# Patient Record
Sex: FEMALE | Race: OTHER
[De-identification: names, ages, dates, MRNs, and addresses within clinical notes are randomized per-mention and may not be internally consistent; named-entity substitution may affect disease eponyms.]

---

## 2019-06-26 ENCOUNTER — HOSPITAL ENCOUNTER (OUTPATIENT)
Dept: HOSPITAL 15 - LAB | Age: 84
Discharge: HOME | End: 2019-06-26
Attending: INTERNAL MEDICINE
Payer: COMMERCIAL

## 2019-06-26 DIAGNOSIS — E11.9: Primary | ICD-10-CM

## 2019-06-26 PROCEDURE — 82043 UR ALBUMIN QUANTITATIVE: CPT

## 2019-06-26 PROCEDURE — 83036 HEMOGLOBIN GLYCOSYLATED A1C: CPT

## 2019-06-26 PROCEDURE — 36415 COLL VENOUS BLD VENIPUNCTURE: CPT

## 2020-05-28 ENCOUNTER — HOSPITAL ENCOUNTER (OUTPATIENT)
Dept: HOSPITAL 15 - LAB | Age: 85
Discharge: HOME | End: 2020-05-28
Attending: INTERNAL MEDICINE
Payer: COMMERCIAL

## 2020-05-28 DIAGNOSIS — E11.9: Primary | ICD-10-CM

## 2020-05-28 DIAGNOSIS — I10: ICD-10-CM

## 2020-05-28 LAB
CRYSTALS UR QL AUTO: (no result) /HPF
WBC CLUMPS UR QL AUTO: PRESENT /HPF

## 2020-05-28 PROCEDURE — 82043 UR ALBUMIN QUANTITATIVE: CPT

## 2020-05-28 PROCEDURE — 36415 COLL VENOUS BLD VENIPUNCTURE: CPT

## 2020-05-28 PROCEDURE — 83036 HEMOGLOBIN GLYCOSYLATED A1C: CPT

## 2020-05-28 PROCEDURE — 81001 URINALYSIS AUTO W/SCOPE: CPT

## 2020-10-23 ENCOUNTER — HOSPITAL ENCOUNTER (INPATIENT)
Dept: HOSPITAL 15 - ER | Age: 85
LOS: 3 days | Discharge: HOME | DRG: 103 | End: 2020-10-26
Attending: FAMILY MEDICINE | Admitting: NURSE PRACTITIONER
Payer: COMMERCIAL

## 2020-10-23 VITALS — BODY MASS INDEX: 28.2 KG/M2 | HEIGHT: 66 IN | WEIGHT: 175.49 LBS

## 2020-10-23 DIAGNOSIS — Z79.891: ICD-10-CM

## 2020-10-23 DIAGNOSIS — Z90.710: ICD-10-CM

## 2020-10-23 DIAGNOSIS — Z79.01: ICD-10-CM

## 2020-10-23 DIAGNOSIS — Z79.899: ICD-10-CM

## 2020-10-23 DIAGNOSIS — Z86.73: ICD-10-CM

## 2020-10-23 DIAGNOSIS — I48.91: ICD-10-CM

## 2020-10-23 DIAGNOSIS — G44.40: Primary | ICD-10-CM

## 2020-10-23 DIAGNOSIS — K21.9: ICD-10-CM

## 2020-10-23 DIAGNOSIS — G25.81: ICD-10-CM

## 2020-10-23 DIAGNOSIS — Z88.2: ICD-10-CM

## 2020-10-23 DIAGNOSIS — I48.0: ICD-10-CM

## 2020-10-23 DIAGNOSIS — I10: ICD-10-CM

## 2020-10-23 DIAGNOSIS — G43.109: ICD-10-CM

## 2020-10-23 DIAGNOSIS — E87.6: ICD-10-CM

## 2020-10-23 DIAGNOSIS — N17.9: ICD-10-CM

## 2020-10-23 DIAGNOSIS — F17.200: ICD-10-CM

## 2020-10-23 DIAGNOSIS — E11.8: ICD-10-CM

## 2020-10-23 DIAGNOSIS — F32.9: ICD-10-CM

## 2020-10-23 DIAGNOSIS — Z82.49: ICD-10-CM

## 2020-10-23 DIAGNOSIS — E78.5: ICD-10-CM

## 2020-10-23 LAB
ALBUMIN SERPL-MCNC: 3.5 G/DL (ref 3.4–5)
ALP SERPL-CCNC: 93 U/L (ref 45–117)
ALT SERPL-CCNC: 32 U/L (ref 13–56)
ANION GAP SERPL CALCULATED.3IONS-SCNC: 6 MMOL/L (ref 5–15)
BILIRUB SERPL-MCNC: 0.6 MG/DL (ref 0.2–1)
BUN SERPL-MCNC: 19 MG/DL (ref 7–18)
BUN/CREAT SERPL: 13.9
CALCIUM SERPL-MCNC: 8.8 MG/DL (ref 8.5–10.1)
CHLORIDE SERPL-SCNC: 98 MMOL/L (ref 98–107)
CO2 SERPL-SCNC: 31 MMOL/L (ref 21–32)
GLUCOSE SERPL-MCNC: 133 MG/DL (ref 74–106)
HCT VFR BLD AUTO: 51.2 % (ref 36–46)
HGB BLD-MCNC: 17.2 G/DL (ref 12.2–16.2)
MAGNESIUM SERPL-MCNC: 1.9 MG/DL (ref 1.6–2.6)
MCH RBC QN AUTO: 31.6 PG (ref 28–32)
MCV RBC AUTO: 93.8 FL (ref 80–100)
NRBC BLD QL AUTO: 0.2 %
POTASSIUM SERPL-SCNC: 3 MMOL/L (ref 3.5–5.1)
PROT SERPL-MCNC: 7 G/DL (ref 6.4–8.2)
SODIUM SERPL-SCNC: 135 MMOL/L (ref 136–145)

## 2020-10-23 PROCEDURE — 71045 X-RAY EXAM CHEST 1 VIEW: CPT

## 2020-10-23 PROCEDURE — 85025 COMPLETE CBC W/AUTO DIFF WBC: CPT

## 2020-10-23 PROCEDURE — 84484 ASSAY OF TROPONIN QUANT: CPT

## 2020-10-23 PROCEDURE — 83036 HEMOGLOBIN GLYCOSYLATED A1C: CPT

## 2020-10-23 PROCEDURE — 80053 COMPREHEN METABOLIC PANEL: CPT

## 2020-10-23 PROCEDURE — 93306 TTE W/DOPPLER COMPLETE: CPT

## 2020-10-23 PROCEDURE — 83735 ASSAY OF MAGNESIUM: CPT

## 2020-10-23 PROCEDURE — 70450 CT HEAD/BRAIN W/O DYE: CPT

## 2020-10-23 PROCEDURE — 93886 INTRACRANIAL COMPLETE STUDY: CPT

## 2020-10-23 PROCEDURE — 85652 RBC SED RATE AUTOMATED: CPT

## 2020-10-23 PROCEDURE — 36415 COLL VENOUS BLD VENIPUNCTURE: CPT

## 2020-10-24 VITALS — DIASTOLIC BLOOD PRESSURE: 97 MMHG | SYSTOLIC BLOOD PRESSURE: 131 MMHG

## 2020-10-24 VITALS — DIASTOLIC BLOOD PRESSURE: 71 MMHG | SYSTOLIC BLOOD PRESSURE: 108 MMHG

## 2020-10-24 VITALS — DIASTOLIC BLOOD PRESSURE: 67 MMHG | SYSTOLIC BLOOD PRESSURE: 129 MMHG

## 2020-10-24 VITALS — DIASTOLIC BLOOD PRESSURE: 68 MMHG | SYSTOLIC BLOOD PRESSURE: 123 MMHG

## 2020-10-24 VITALS — SYSTOLIC BLOOD PRESSURE: 129 MMHG | DIASTOLIC BLOOD PRESSURE: 67 MMHG

## 2020-10-24 VITALS — DIASTOLIC BLOOD PRESSURE: 63 MMHG | SYSTOLIC BLOOD PRESSURE: 112 MMHG

## 2020-10-24 VITALS — DIASTOLIC BLOOD PRESSURE: 64 MMHG | SYSTOLIC BLOOD PRESSURE: 106 MMHG

## 2020-10-24 LAB
ALBUMIN SERPL-MCNC: 2.8 G/DL (ref 3.4–5)
ALP SERPL-CCNC: 76 U/L (ref 45–117)
ALT SERPL-CCNC: 26 U/L (ref 13–56)
ANION GAP SERPL CALCULATED.3IONS-SCNC: 6 MMOL/L (ref 5–15)
BILIRUB SERPL-MCNC: 0.5 MG/DL (ref 0.2–1)
BUN SERPL-MCNC: 19 MG/DL (ref 7–18)
BUN/CREAT SERPL: 16.8
CALCIUM SERPL-MCNC: 8.5 MG/DL (ref 8.5–10.1)
CHLORIDE SERPL-SCNC: 104 MMOL/L (ref 98–107)
CO2 SERPL-SCNC: 29 MMOL/L (ref 21–32)
GLUCOSE SERPL-MCNC: 100 MG/DL (ref 74–106)
HCT VFR BLD AUTO: 43.7 % (ref 36–46)
HGB BLD-MCNC: 15.1 G/DL (ref 12.2–16.2)
MCH RBC QN AUTO: 32.3 PG (ref 28–32)
MCV RBC AUTO: 93.4 FL (ref 80–100)
NRBC BLD QL AUTO: 0.2 %
POTASSIUM SERPL-SCNC: 3.4 MMOL/L (ref 3.5–5.1)
PROT SERPL-MCNC: 5.8 G/DL (ref 6.4–8.2)
SODIUM SERPL-SCNC: 139 MMOL/L (ref 136–145)

## 2020-10-24 RX ADMIN — HYDROCODONE BITARTRATE AND ACETAMINOPHEN PRN TAB: 5; 325 TABLET ORAL at 11:04

## 2020-10-24 RX ADMIN — PREGABALIN SCH MG: 25 CAPSULE ORAL at 21:56

## 2020-10-24 RX ADMIN — METOPROLOL TARTRATE SCH MG: 50 TABLET, FILM COATED ORAL at 14:30

## 2020-10-24 RX ADMIN — OXYCODONE HYDROCHLORIDE AND ACETAMINOPHEN SCH MG: 500 TABLET ORAL at 21:56

## 2020-10-24 RX ADMIN — PREGABALIN SCH MG: 25 CAPSULE ORAL at 14:29

## 2020-10-24 RX ADMIN — ONDANSETRON HYDROCHLORIDE PRN MG: 2 INJECTION, SOLUTION INTRAMUSCULAR; INTRAVENOUS at 08:24

## 2020-10-24 RX ADMIN — METOPROLOL TARTRATE SCH MG: 50 TABLET, FILM COATED ORAL at 21:57

## 2020-10-24 RX ADMIN — FAMOTIDINE SCH MG: 20 TABLET, FILM COATED ORAL at 09:28

## 2020-10-24 RX ADMIN — ONDANSETRON HYDROCHLORIDE PRN MG: 2 INJECTION, SOLUTION INTRAMUSCULAR; INTRAVENOUS at 18:08

## 2020-10-24 RX ADMIN — OXYCODONE HYDROCHLORIDE AND ACETAMINOPHEN SCH MG: 500 TABLET ORAL at 09:28

## 2020-10-24 RX ADMIN — APIXABAN SCH MG: 5 TABLET, FILM COATED ORAL at 21:56

## 2020-10-24 RX ADMIN — ONDANSETRON HYDROCHLORIDE PRN MG: 2 INJECTION, SOLUTION INTRAMUSCULAR; INTRAVENOUS at 16:34

## 2020-10-24 RX ADMIN — MORPHINE SULFATE PRN MG: 2 INJECTION, SOLUTION INTRAMUSCULAR; INTRAVENOUS at 00:57

## 2020-10-24 RX ADMIN — ONDANSETRON HYDROCHLORIDE PRN MG: 2 INJECTION, SOLUTION INTRAMUSCULAR; INTRAVENOUS at 00:57

## 2020-10-24 RX ADMIN — MORPHINE SULFATE PRN MG: 2 INJECTION, SOLUTION INTRAMUSCULAR; INTRAVENOUS at 08:24

## 2020-10-24 RX ADMIN — MORPHINE SULFATE PRN MG: 2 INJECTION, SOLUTION INTRAMUSCULAR; INTRAVENOUS at 16:35

## 2020-10-24 RX ADMIN — MORPHINE SULFATE PRN MG: 2 INJECTION, SOLUTION INTRAMUSCULAR; INTRAVENOUS at 18:11

## 2020-10-24 NOTE — NUR
Opening Shift Note

Assumed care of patient, awake and alert.  No S/S of distress/SOB or pain. Assissted 
paitient with use of the bedpan.  Instructed on POC and to call for assist PRN, will 
continue to monitor for changes Q1hr and PRN. Bed locked in lowest position, HOB elevated at 
least 30 degrees, call light within reach and side rails up x 2.

## 2020-10-24 NOTE — NUR
hospitalist paged



patient brought to floor. patient complaining of headache 10/10. patient states that they 
were given morphine in the ER. Medication not continued on floor. patient requesting 
morphine for headache. will await call back or orders.

## 2020-10-25 VITALS — SYSTOLIC BLOOD PRESSURE: 116 MMHG | DIASTOLIC BLOOD PRESSURE: 57 MMHG

## 2020-10-25 VITALS — SYSTOLIC BLOOD PRESSURE: 123 MMHG | DIASTOLIC BLOOD PRESSURE: 71 MMHG

## 2020-10-25 VITALS — SYSTOLIC BLOOD PRESSURE: 100 MMHG | DIASTOLIC BLOOD PRESSURE: 56 MMHG

## 2020-10-25 VITALS — DIASTOLIC BLOOD PRESSURE: 55 MMHG | SYSTOLIC BLOOD PRESSURE: 107 MMHG

## 2020-10-25 VITALS — DIASTOLIC BLOOD PRESSURE: 70 MMHG | SYSTOLIC BLOOD PRESSURE: 124 MMHG

## 2020-10-25 RX ADMIN — PREGABALIN SCH MG: 25 CAPSULE ORAL at 06:09

## 2020-10-25 RX ADMIN — METOPROLOL TARTRATE SCH MG: 50 TABLET, FILM COATED ORAL at 06:10

## 2020-10-25 RX ADMIN — Medication SCH MG: at 21:39

## 2020-10-25 RX ADMIN — HYDROCODONE BITARTRATE AND ACETAMINOPHEN PRN TAB: 5; 325 TABLET ORAL at 21:45

## 2020-10-25 RX ADMIN — APIXABAN SCH MG: 5 TABLET, FILM COATED ORAL at 10:39

## 2020-10-25 RX ADMIN — APIXABAN SCH MG: 5 TABLET, FILM COATED ORAL at 21:39

## 2020-10-25 RX ADMIN — METOPROLOL TARTRATE SCH MG: 50 TABLET, FILM COATED ORAL at 13:13

## 2020-10-25 RX ADMIN — ACETAMINOPHEN PRN MG: 325 TABLET ORAL at 08:04

## 2020-10-25 RX ADMIN — ACETAMINOPHEN PRN MG: 325 TABLET ORAL at 14:05

## 2020-10-25 RX ADMIN — TRIAMTERENE AND HYDROCHLOROTHIAZIDE SCH TAB: 75; 50 TABLET ORAL at 10:40

## 2020-10-25 RX ADMIN — FAMOTIDINE SCH MG: 20 TABLET, FILM COATED ORAL at 10:40

## 2020-10-25 RX ADMIN — METOPROLOL TARTRATE SCH MG: 50 TABLET, FILM COATED ORAL at 21:40

## 2020-10-25 RX ADMIN — PREGABALIN SCH MG: 25 CAPSULE ORAL at 21:39

## 2020-10-25 RX ADMIN — SODIUM CHLORIDE SCH MLS/HR: 0.9 INJECTION, SOLUTION INTRAVENOUS at 21:39

## 2020-10-25 RX ADMIN — PREGABALIN SCH MG: 25 CAPSULE ORAL at 13:12

## 2020-10-25 RX ADMIN — NORTRIPTYLINE HYDROCHLORIDE SCH MG: 25 CAPSULE ORAL at 11:26

## 2020-10-25 RX ADMIN — MORPHINE SULFATE PRN MG: 2 INJECTION, SOLUTION INTRAMUSCULAR; INTRAVENOUS at 16:05

## 2020-10-25 RX ADMIN — ONDANSETRON HYDROCHLORIDE PRN MG: 2 INJECTION, SOLUTION INTRAMUSCULAR; INTRAVENOUS at 08:08

## 2020-10-25 NOTE — NUR
DR GARZA AT BEDSIDE. INSTRUCTED TO BRING UP CURRENT DOSAGE OF METOPROLOL TO CARDIOLOGIST 
WHEN CARDIOLOGIST PERFORMS HIS ROUNDS.

## 2020-10-25 NOTE — NUR
Opening Shift Note

Assumed care of patient resting in bed with eyes closed. Respirations even and unlabored at 
this time. Will continue to monitor for changes Q1hr and PRN. Bed locked in lowest position, 
HOB elevated at least 30 degrees, call light within reach and side rails up x 2.

## 2020-10-25 NOTE — NUR
RUFINO JEAN PAGED REGARDING PATIENT'S CURRENT DOSAGE OF METOPROLOL. AWAITING CALL 
BACK. CONTINUE CARE.

## 2020-10-26 VITALS — DIASTOLIC BLOOD PRESSURE: 57 MMHG | SYSTOLIC BLOOD PRESSURE: 100 MMHG

## 2020-10-26 VITALS — DIASTOLIC BLOOD PRESSURE: 72 MMHG | SYSTOLIC BLOOD PRESSURE: 125 MMHG

## 2020-10-26 VITALS — DIASTOLIC BLOOD PRESSURE: 50 MMHG | SYSTOLIC BLOOD PRESSURE: 118 MMHG

## 2020-10-26 VITALS — DIASTOLIC BLOOD PRESSURE: 59 MMHG | SYSTOLIC BLOOD PRESSURE: 109 MMHG

## 2020-10-26 VITALS — SYSTOLIC BLOOD PRESSURE: 113 MMHG | DIASTOLIC BLOOD PRESSURE: 61 MMHG

## 2020-10-26 VITALS — SYSTOLIC BLOOD PRESSURE: 101 MMHG | DIASTOLIC BLOOD PRESSURE: 59 MMHG

## 2020-10-26 RX ADMIN — PREGABALIN SCH MG: 25 CAPSULE ORAL at 06:31

## 2020-10-26 RX ADMIN — HYDROCODONE BITARTRATE AND ACETAMINOPHEN PRN TAB: 5; 325 TABLET ORAL at 17:03

## 2020-10-26 RX ADMIN — Medication SCH MG: at 16:28

## 2020-10-26 RX ADMIN — SODIUM CHLORIDE SCH MLS/HR: 0.9 INJECTION, SOLUTION INTRAVENOUS at 16:59

## 2020-10-26 RX ADMIN — Medication SCH MG: at 06:31

## 2020-10-26 RX ADMIN — METOPROLOL TARTRATE SCH MG: 50 TABLET, FILM COATED ORAL at 09:01

## 2020-10-26 RX ADMIN — METOPROLOL TARTRATE SCH MG: 50 TABLET, FILM COATED ORAL at 06:30

## 2020-10-26 RX ADMIN — PREGABALIN SCH MG: 25 CAPSULE ORAL at 16:28

## 2020-10-26 RX ADMIN — MORPHINE SULFATE PRN MG: 2 INJECTION, SOLUTION INTRAMUSCULAR; INTRAVENOUS at 04:27

## 2020-10-26 RX ADMIN — ONDANSETRON HYDROCHLORIDE PRN MG: 2 INJECTION, SOLUTION INTRAMUSCULAR; INTRAVENOUS at 04:27

## 2020-10-26 RX ADMIN — TRIAMTERENE AND HYDROCHLOROTHIAZIDE SCH TAB: 75; 50 TABLET ORAL at 09:01

## 2020-10-26 RX ADMIN — SODIUM CHLORIDE SCH MLS/HR: 0.9 INJECTION, SOLUTION INTRAVENOUS at 06:50

## 2020-10-26 RX ADMIN — FAMOTIDINE SCH MG: 20 TABLET, FILM COATED ORAL at 09:00

## 2020-10-26 RX ADMIN — APIXABAN SCH MG: 5 TABLET, FILM COATED ORAL at 09:02

## 2020-10-26 RX ADMIN — NORTRIPTYLINE HYDROCHLORIDE SCH MG: 25 CAPSULE ORAL at 09:02

## 2020-10-26 RX ADMIN — HYDROCODONE BITARTRATE AND ACETAMINOPHEN PRN TAB: 5; 325 TABLET ORAL at 08:59

## 2020-10-26 NOTE — NUR
ss consult

Patient requesting advanced directive. Patient has been provided with advanced directive. 

-------------------------------------------------------------------------------

Addendum: 10/26/20 at 1708 by Debby ROSALES

-------------------------------------------------------------------------------

Amended: Links added.

## 2020-12-24 ENCOUNTER — HOSPITAL ENCOUNTER (INPATIENT)
Dept: HOSPITAL 15 - ER | Age: 85
LOS: 6 days | Discharge: HOME | DRG: 871 | End: 2020-12-30
Attending: FAMILY MEDICINE | Admitting: INTERNAL MEDICINE
Payer: COMMERCIAL

## 2020-12-24 VITALS — HEIGHT: 70 IN | WEIGHT: 149.03 LBS | BODY MASS INDEX: 21.34 KG/M2

## 2020-12-24 DIAGNOSIS — Z66: ICD-10-CM

## 2020-12-24 DIAGNOSIS — J18.9: ICD-10-CM

## 2020-12-24 DIAGNOSIS — Z88.2: ICD-10-CM

## 2020-12-24 DIAGNOSIS — I50.30: ICD-10-CM

## 2020-12-24 DIAGNOSIS — Z86.73: ICD-10-CM

## 2020-12-24 DIAGNOSIS — M35.3: ICD-10-CM

## 2020-12-24 DIAGNOSIS — E88.09: ICD-10-CM

## 2020-12-24 DIAGNOSIS — I48.91: ICD-10-CM

## 2020-12-24 DIAGNOSIS — E87.6: ICD-10-CM

## 2020-12-24 DIAGNOSIS — N18.30: ICD-10-CM

## 2020-12-24 DIAGNOSIS — N17.0: ICD-10-CM

## 2020-12-24 DIAGNOSIS — E87.0: ICD-10-CM

## 2020-12-24 DIAGNOSIS — G89.4: ICD-10-CM

## 2020-12-24 DIAGNOSIS — E11.22: ICD-10-CM

## 2020-12-24 DIAGNOSIS — J96.00: ICD-10-CM

## 2020-12-24 DIAGNOSIS — E11.649: ICD-10-CM

## 2020-12-24 DIAGNOSIS — A41.01: Primary | ICD-10-CM

## 2020-12-24 DIAGNOSIS — Z82.49: ICD-10-CM

## 2020-12-24 DIAGNOSIS — I48.92: ICD-10-CM

## 2020-12-24 DIAGNOSIS — F32.9: ICD-10-CM

## 2020-12-24 DIAGNOSIS — D68.69: ICD-10-CM

## 2020-12-24 DIAGNOSIS — R62.7: ICD-10-CM

## 2020-12-24 DIAGNOSIS — I13.0: ICD-10-CM

## 2020-12-24 DIAGNOSIS — Z51.5: ICD-10-CM

## 2020-12-24 DIAGNOSIS — E78.5: ICD-10-CM

## 2020-12-24 DIAGNOSIS — E87.1: ICD-10-CM

## 2020-12-24 DIAGNOSIS — K21.9: ICD-10-CM

## 2020-12-24 DIAGNOSIS — E44.0: ICD-10-CM

## 2020-12-24 DIAGNOSIS — Z20.828: ICD-10-CM

## 2020-12-24 LAB
ALBUMIN SERPL-MCNC: 2.6 G/DL (ref 3.4–5)
ALP SERPL-CCNC: 98 U/L (ref 45–117)
ALT SERPL-CCNC: 44 U/L (ref 13–56)
ANION GAP SERPL CALCULATED.3IONS-SCNC: 12 MMOL/L (ref 5–15)
BILIRUB SERPL-MCNC: 0.5 MG/DL (ref 0.2–1)
BUN SERPL-MCNC: 63 MG/DL (ref 7–18)
BUN/CREAT SERPL: 21.4
CALCIUM SERPL-MCNC: 8.4 MG/DL (ref 8.5–10.1)
CHLORIDE SERPL-SCNC: 92 MMOL/L (ref 98–107)
CO2 SERPL-SCNC: 24 MMOL/L (ref 21–32)
GLUCOSE SERPL-MCNC: 67 MG/DL (ref 74–106)
HCT VFR BLD AUTO: 40.6 % (ref 36–46)
HGB BLD-MCNC: 13.8 G/DL (ref 12.2–16.2)
LACTATE PLASV-SCNC: 4.1 MMOL/L (ref 0.4–2)
MCH RBC QN AUTO: 32.4 PG (ref 28–32)
MCV RBC AUTO: 95.8 FL (ref 80–100)
NRBC BLD QL AUTO: 0.3 %
POTASSIUM SERPL-SCNC: 4.3 MMOL/L (ref 3.5–5.1)
PROT SERPL-MCNC: 6.8 G/DL (ref 6.4–8.2)
SODIUM SERPL-SCNC: 128 MMOL/L (ref 136–145)

## 2020-12-24 PROCEDURE — 84484 ASSAY OF TROPONIN QUANT: CPT

## 2020-12-24 PROCEDURE — 85007 BL SMEAR W/DIFF WBC COUNT: CPT

## 2020-12-24 PROCEDURE — 84443 ASSAY THYROID STIM HORMONE: CPT

## 2020-12-24 PROCEDURE — 85379 FIBRIN DEGRADATION QUANT: CPT

## 2020-12-24 PROCEDURE — 76775 US EXAM ABDO BACK WALL LIM: CPT

## 2020-12-24 PROCEDURE — 80048 BASIC METABOLIC PNL TOTAL CA: CPT

## 2020-12-24 PROCEDURE — 82962 GLUCOSE BLOOD TEST: CPT

## 2020-12-24 PROCEDURE — 71045 X-RAY EXAM CHEST 1 VIEW: CPT

## 2020-12-24 PROCEDURE — 85025 COMPLETE CBC W/AUTO DIFF WBC: CPT

## 2020-12-24 PROCEDURE — 80053 COMPREHEN METABOLIC PANEL: CPT

## 2020-12-24 PROCEDURE — 83605 ASSAY OF LACTIC ACID: CPT

## 2020-12-24 PROCEDURE — 87077 CULTURE AEROBIC IDENTIFY: CPT

## 2020-12-24 PROCEDURE — 82805 BLOOD GASES W/O2 SATURATION: CPT

## 2020-12-24 PROCEDURE — 85027 COMPLETE CBC AUTOMATED: CPT

## 2020-12-24 PROCEDURE — 96361 HYDRATE IV INFUSION ADD-ON: CPT

## 2020-12-24 PROCEDURE — 87426 SARSCOV CORONAVIRUS AG IA: CPT

## 2020-12-24 PROCEDURE — 82570 ASSAY OF URINE CREATININE: CPT

## 2020-12-24 PROCEDURE — 36415 COLL VENOUS BLD VENIPUNCTURE: CPT

## 2020-12-24 PROCEDURE — 81001 URINALYSIS AUTO W/SCOPE: CPT

## 2020-12-24 PROCEDURE — 83036 HEMOGLOBIN GLYCOSYLATED A1C: CPT

## 2020-12-24 PROCEDURE — 86141 C-REACTIVE PROTEIN HS: CPT

## 2020-12-24 PROCEDURE — 83880 ASSAY OF NATRIURETIC PEPTIDE: CPT

## 2020-12-24 PROCEDURE — 36600 WITHDRAWAL OF ARTERIAL BLOOD: CPT

## 2020-12-24 PROCEDURE — 87040 BLOOD CULTURE FOR BACTERIA: CPT

## 2020-12-24 PROCEDURE — 87086 URINE CULTURE/COLONY COUNT: CPT

## 2020-12-24 PROCEDURE — 87186 SC STD MICRODIL/AGAR DIL: CPT

## 2020-12-24 PROCEDURE — 84156 ASSAY OF PROTEIN URINE: CPT

## 2020-12-24 PROCEDURE — 84300 ASSAY OF URINE SODIUM: CPT

## 2020-12-24 PROCEDURE — 82550 ASSAY OF CK (CPK): CPT

## 2020-12-24 PROCEDURE — 96365 THER/PROPH/DIAG IV INF INIT: CPT

## 2020-12-24 RX ADMIN — SODIUM CHLORIDE SCH MLS/HR: 0.9 INJECTION, SOLUTION INTRAVENOUS at 19:52

## 2020-12-24 RX ADMIN — ENOXAPARIN SODIUM SCH MG: 40 INJECTION SUBCUTANEOUS at 22:07

## 2020-12-24 RX ADMIN — CLINDAMYCIN PHOSPHATE SCH MLS/HR: 150 INJECTION, SOLUTION INTRAVENOUS at 22:07

## 2020-12-24 RX ADMIN — FUROSEMIDE SCH MG: 10 INJECTION, SOLUTION INTRAMUSCULAR; INTRAVENOUS at 23:47

## 2020-12-25 LAB
ALBUMIN SERPL-MCNC: 2.3 G/DL (ref 3.4–5)
ALP SERPL-CCNC: 105 U/L (ref 45–117)
ALT SERPL-CCNC: 32 U/L (ref 13–56)
ANION GAP SERPL CALCULATED.3IONS-SCNC: 14 MMOL/L (ref 5–15)
BILIRUB SERPL-MCNC: 0.6 MG/DL (ref 0.2–1)
BUN SERPL-MCNC: 71 MG/DL (ref 7–18)
BUN/CREAT SERPL: 29.3
CALCIUM SERPL-MCNC: 8.1 MG/DL (ref 8.5–10.1)
CHLORIDE SERPL-SCNC: 94 MMOL/L (ref 98–107)
CO2 SERPL-SCNC: 24 MMOL/L (ref 21–32)
GLUCOSE SERPL-MCNC: 46 MG/DL (ref 74–106)
HCT VFR BLD AUTO: 39.1 % (ref 36–46)
HGB BLD-MCNC: 13.7 G/DL (ref 12.2–16.2)
MCH RBC QN AUTO: 33.2 PG (ref 28–32)
MCV RBC AUTO: 94.5 FL (ref 80–100)
NRBC BLD QL AUTO: 1 %
POTASSIUM SERPL-SCNC: 4.1 MMOL/L (ref 3.5–5.1)
PROT SERPL-MCNC: 6.6 G/DL (ref 6.4–8.2)
SODIUM SERPL-SCNC: 132 MMOL/L (ref 136–145)

## 2020-12-25 RX ADMIN — ONDANSETRON HYDROCHLORIDE PRN MG: 2 INJECTION, SOLUTION INTRAMUSCULAR; INTRAVENOUS at 02:31

## 2020-12-25 RX ADMIN — CLINDAMYCIN PHOSPHATE SCH MLS/HR: 150 INJECTION, SOLUTION INTRAVENOUS at 22:36

## 2020-12-25 RX ADMIN — FUROSEMIDE SCH MG: 10 INJECTION, SOLUTION INTRAMUSCULAR; INTRAVENOUS at 20:28

## 2020-12-25 RX ADMIN — CLINDAMYCIN PHOSPHATE SCH MLS/HR: 150 INJECTION, SOLUTION INTRAVENOUS at 05:50

## 2020-12-25 RX ADMIN — MORPHINE SULFATE PRN MG: 2 INJECTION, SOLUTION INTRAMUSCULAR; INTRAVENOUS at 20:21

## 2020-12-25 RX ADMIN — Medication SCH ML: at 20:51

## 2020-12-25 RX ADMIN — ENOXAPARIN SODIUM SCH MG: 40 INJECTION SUBCUTANEOUS at 22:36

## 2020-12-25 RX ADMIN — ONDANSETRON HYDROCHLORIDE PRN MG: 2 INJECTION, SOLUTION INTRAMUSCULAR; INTRAVENOUS at 20:21

## 2020-12-25 RX ADMIN — LABETALOL HYDROCHLORIDE PRN MG: 5 INJECTION, SOLUTION INTRAVENOUS at 19:33

## 2020-12-25 RX ADMIN — BUDESONIDE SCH MCG: 180 AEROSOL, POWDER RESPIRATORY (INHALATION) at 01:33

## 2020-12-25 RX ADMIN — FUROSEMIDE SCH MG: 10 INJECTION, SOLUTION INTRAMUSCULAR; INTRAVENOUS at 05:50

## 2020-12-25 RX ADMIN — SODIUM CHLORIDE SCH MLS/HR: 0.9 INJECTION, SOLUTION INTRAVENOUS at 05:23

## 2020-12-25 RX ADMIN — BUDESONIDE SCH MCG: 180 AEROSOL, POWDER RESPIRATORY (INHALATION) at 11:31

## 2020-12-25 RX ADMIN — Medication SCH UNIT: at 10:59

## 2020-12-25 RX ADMIN — MORPHINE SULFATE PRN MG: 2 INJECTION, SOLUTION INTRAMUSCULAR; INTRAVENOUS at 02:31

## 2020-12-25 RX ADMIN — SODIUM CHLORIDE SCH MLS/HR: 0.9 INJECTION, SOLUTION INTRAVENOUS at 22:29

## 2020-12-25 RX ADMIN — Medication SCH MG: at 10:59

## 2020-12-25 RX ADMIN — ZINC SULFATE CAP 220 MG (50 MG ELEMENTAL ZN) SCH MG: 220 (50 ZN) CAP at 10:59

## 2020-12-25 RX ADMIN — CLINDAMYCIN PHOSPHATE SCH MLS/HR: 150 INJECTION, SOLUTION INTRAVENOUS at 14:00

## 2020-12-26 LAB
ALBUMIN SERPL-MCNC: 2.1 G/DL (ref 3.4–5)
ALP SERPL-CCNC: 115 U/L (ref 45–117)
ALT SERPL-CCNC: 29 U/L (ref 13–56)
ANION GAP SERPL CALCULATED.3IONS-SCNC: 10 MMOL/L (ref 5–15)
BILIRUB SERPL-MCNC: 0.5 MG/DL (ref 0.2–1)
BUN SERPL-MCNC: 60 MG/DL (ref 7–18)
BUN/CREAT SERPL: 34.9
CALCIUM SERPL-MCNC: 9.2 MG/DL (ref 8.5–10.1)
CHLORIDE SERPL-SCNC: 98 MMOL/L (ref 98–107)
CO2 SERPL-SCNC: 29 MMOL/L (ref 21–32)
GLUCOSE SERPL-MCNC: 102 MG/DL (ref 74–106)
HCT VFR BLD AUTO: 40.9 % (ref 36–46)
HGB BLD-MCNC: 14.1 G/DL (ref 12.2–16.2)
MCH RBC QN AUTO: 32.5 PG (ref 28–32)
MCV RBC AUTO: 94.4 FL (ref 80–100)
POTASSIUM SERPL-SCNC: 3.7 MMOL/L (ref 3.5–5.1)
PROT SERPL-MCNC: 7.3 G/DL (ref 6.4–8.2)
SODIUM SERPL-SCNC: 137 MMOL/L (ref 136–145)

## 2020-12-26 RX ADMIN — LABETALOL HYDROCHLORIDE PRN MG: 5 INJECTION, SOLUTION INTRAVENOUS at 18:44

## 2020-12-26 RX ADMIN — FUROSEMIDE SCH MG: 10 INJECTION, SOLUTION INTRAMUSCULAR; INTRAVENOUS at 19:00

## 2020-12-26 RX ADMIN — ZINC SULFATE CAP 220 MG (50 MG ELEMENTAL ZN) SCH MG: 220 (50 ZN) CAP at 09:59

## 2020-12-26 RX ADMIN — Medication SCH MG: at 09:59

## 2020-12-26 RX ADMIN — SODIUM CHLORIDE SCH MLS/HR: 0.9 INJECTION, SOLUTION INTRAVENOUS at 08:15

## 2020-12-26 RX ADMIN — LABETALOL HYDROCHLORIDE PRN MG: 5 INJECTION, SOLUTION INTRAVENOUS at 04:56

## 2020-12-26 RX ADMIN — MORPHINE SULFATE PRN MG: 2 INJECTION, SOLUTION INTRAMUSCULAR; INTRAVENOUS at 06:21

## 2020-12-26 RX ADMIN — ONDANSETRON HYDROCHLORIDE PRN MG: 2 INJECTION, SOLUTION INTRAMUSCULAR; INTRAVENOUS at 11:24

## 2020-12-26 RX ADMIN — FUROSEMIDE SCH MG: 10 INJECTION, SOLUTION INTRAMUSCULAR; INTRAVENOUS at 07:36

## 2020-12-26 RX ADMIN — Medication SCH ML: at 12:25

## 2020-12-26 RX ADMIN — LABETALOL HYDROCHLORIDE PRN MG: 5 INJECTION, SOLUTION INTRAVENOUS at 12:53

## 2020-12-26 RX ADMIN — Medication SCH UNIT: at 09:59

## 2020-12-26 RX ADMIN — Medication SCH ML: at 18:00

## 2020-12-26 RX ADMIN — Medication SCH ML: at 08:45

## 2020-12-26 RX ADMIN — CLINDAMYCIN PHOSPHATE SCH MLS/HR: 150 INJECTION, SOLUTION INTRAVENOUS at 06:20

## 2020-12-26 RX ADMIN — ENOXAPARIN SODIUM SCH MG: 40 INJECTION SUBCUTANEOUS at 22:00

## 2020-12-26 RX ADMIN — CLINDAMYCIN PHOSPHATE SCH MLS/HR: 150 INJECTION, SOLUTION INTRAVENOUS at 22:00

## 2020-12-26 RX ADMIN — LABETALOL HYDROCHLORIDE PRN MG: 5 INJECTION, SOLUTION INTRAVENOUS at 23:33

## 2020-12-26 RX ADMIN — CLINDAMYCIN PHOSPHATE SCH MLS/HR: 150 INJECTION, SOLUTION INTRAVENOUS at 14:43

## 2020-12-26 RX ADMIN — MORPHINE SULFATE PRN MG: 2 INJECTION, SOLUTION INTRAMUSCULAR; INTRAVENOUS at 11:24

## 2020-12-26 RX ADMIN — ONDANSETRON HYDROCHLORIDE PRN MG: 2 INJECTION, SOLUTION INTRAMUSCULAR; INTRAVENOUS at 06:21

## 2020-12-26 RX ADMIN — SODIUM CHLORIDE SCH MLS/HR: 0.9 INJECTION, SOLUTION INTRAVENOUS at 21:14

## 2020-12-27 LAB
ANION GAP SERPL CALCULATED.3IONS-SCNC: 7 MMOL/L (ref 5–15)
BUN SERPL-MCNC: 72 MG/DL (ref 7–18)
BUN/CREAT SERPL: 43.1
CALCIUM SERPL-MCNC: 9.9 MG/DL (ref 8.5–10.1)
CHLORIDE SERPL-SCNC: 102 MMOL/L (ref 98–107)
CO2 SERPL-SCNC: 33 MMOL/L (ref 21–32)
GLUCOSE SERPL-MCNC: 151 MG/DL (ref 74–106)
HCT VFR BLD AUTO: 41.8 % (ref 36–46)
HGB BLD-MCNC: 14.6 G/DL (ref 12.2–16.2)
MCH RBC QN AUTO: 32.9 PG (ref 28–32)
MCV RBC AUTO: 94.1 FL (ref 80–100)
NRBC BLD QL AUTO: 0.1 %
POTASSIUM SERPL-SCNC: 3.8 MMOL/L (ref 3.5–5.1)
SODIUM SERPL-SCNC: 142 MMOL/L (ref 136–145)

## 2020-12-27 RX ADMIN — SODIUM CHLORIDE SCH MLS/HR: 0.9 INJECTION, SOLUTION INTRAVENOUS at 23:41

## 2020-12-27 RX ADMIN — LABETALOL HYDROCHLORIDE PRN MG: 5 INJECTION, SOLUTION INTRAVENOUS at 04:01

## 2020-12-27 RX ADMIN — FUROSEMIDE SCH MG: 10 INJECTION, SOLUTION INTRAMUSCULAR; INTRAVENOUS at 06:16

## 2020-12-27 RX ADMIN — FUROSEMIDE SCH MG: 10 INJECTION, SOLUTION INTRAMUSCULAR; INTRAVENOUS at 18:00

## 2020-12-27 RX ADMIN — METOPROLOL SUCCINATE SCH MG: 50 TABLET, EXTENDED RELEASE ORAL at 08:23

## 2020-12-27 RX ADMIN — ONDANSETRON HYDROCHLORIDE PRN MG: 2 INJECTION, SOLUTION INTRAMUSCULAR; INTRAVENOUS at 17:28

## 2020-12-27 RX ADMIN — LABETALOL HYDROCHLORIDE PRN MG: 5 INJECTION, SOLUTION INTRAVENOUS at 15:31

## 2020-12-27 RX ADMIN — Medication SCH ML: at 18:00

## 2020-12-27 RX ADMIN — Medication SCH ML: at 08:22

## 2020-12-27 RX ADMIN — ZINC SULFATE CAP 220 MG (50 MG ELEMENTAL ZN) SCH MG: 220 (50 ZN) CAP at 08:23

## 2020-12-27 RX ADMIN — SODIUM CHLORIDE SCH MLS/HR: 0.9 INJECTION, SOLUTION INTRAVENOUS at 10:25

## 2020-12-27 RX ADMIN — CLINDAMYCIN PHOSPHATE SCH MLS/HR: 150 INJECTION, SOLUTION INTRAVENOUS at 06:15

## 2020-12-27 RX ADMIN — Medication SCH UNIT: at 08:23

## 2020-12-27 RX ADMIN — Medication SCH ML: at 12:00

## 2020-12-27 RX ADMIN — ONDANSETRON HYDROCHLORIDE PRN MG: 2 INJECTION, SOLUTION INTRAMUSCULAR; INTRAVENOUS at 02:35

## 2020-12-27 RX ADMIN — LABETALOL HYDROCHLORIDE PRN MG: 5 INJECTION, SOLUTION INTRAVENOUS at 21:45

## 2020-12-27 RX ADMIN — Medication SCH MG: at 08:23

## 2020-12-27 RX ADMIN — MORPHINE SULFATE PRN MG: 2 INJECTION, SOLUTION INTRAMUSCULAR; INTRAVENOUS at 02:35

## 2020-12-27 RX ADMIN — CLINDAMYCIN PHOSPHATE SCH MLS/HR: 150 INJECTION, SOLUTION INTRAVENOUS at 14:35

## 2020-12-27 RX ADMIN — CLINDAMYCIN PHOSPHATE SCH MLS/HR: 150 INJECTION, SOLUTION INTRAVENOUS at 22:00

## 2020-12-27 RX ADMIN — MORPHINE SULFATE PRN MG: 2 INJECTION, SOLUTION INTRAMUSCULAR; INTRAVENOUS at 17:28

## 2020-12-27 RX ADMIN — ENOXAPARIN SODIUM SCH MG: 40 INJECTION SUBCUTANEOUS at 22:00

## 2020-12-28 VITALS — SYSTOLIC BLOOD PRESSURE: 100 MMHG | DIASTOLIC BLOOD PRESSURE: 62 MMHG

## 2020-12-28 LAB
ANION GAP SERPL CALCULATED.3IONS-SCNC: 6 MMOL/L (ref 5–15)
BUN SERPL-MCNC: 65 MG/DL (ref 7–18)
BUN/CREAT SERPL: 44.5
CALCIUM SERPL-MCNC: 10.2 MG/DL (ref 8.5–10.1)
CHLORIDE SERPL-SCNC: 108 MMOL/L (ref 98–107)
CO2 SERPL-SCNC: 34 MMOL/L (ref 21–32)
GLUCOSE SERPL-MCNC: 147 MG/DL (ref 74–106)
HCT VFR BLD AUTO: 47.7 % (ref 36–46)
HGB BLD-MCNC: 16.6 G/DL (ref 12.2–16.2)
MCH RBC QN AUTO: 32.6 PG (ref 28–32)
MCV RBC AUTO: 94 FL (ref 80–100)
POTASSIUM SERPL-SCNC: 3.4 MMOL/L (ref 3.5–5.1)
SODIUM SERPL-SCNC: 148 MMOL/L (ref 136–145)

## 2020-12-28 RX ADMIN — ZINC SULFATE CAP 220 MG (50 MG ELEMENTAL ZN) SCH MG: 220 (50 ZN) CAP at 10:00

## 2020-12-28 RX ADMIN — LABETALOL HYDROCHLORIDE PRN MG: 5 INJECTION, SOLUTION INTRAVENOUS at 14:12

## 2020-12-28 RX ADMIN — Medication SCH ML: at 11:44

## 2020-12-28 RX ADMIN — MORPHINE SULFATE PRN MG: 2 INJECTION, SOLUTION INTRAMUSCULAR; INTRAVENOUS at 01:50

## 2020-12-28 RX ADMIN — METOPROLOL SUCCINATE SCH MG: 50 TABLET, EXTENDED RELEASE ORAL at 10:00

## 2020-12-28 RX ADMIN — LABETALOL HYDROCHLORIDE PRN MG: 5 INJECTION, SOLUTION INTRAVENOUS at 07:56

## 2020-12-28 RX ADMIN — Medication SCH ML: at 18:00

## 2020-12-28 RX ADMIN — Medication SCH ML: at 08:00

## 2020-12-28 RX ADMIN — Medication SCH MG: at 10:00

## 2020-12-28 RX ADMIN — ONDANSETRON HYDROCHLORIDE PRN MG: 2 INJECTION, SOLUTION INTRAMUSCULAR; INTRAVENOUS at 01:50

## 2020-12-28 RX ADMIN — CLINDAMYCIN PHOSPHATE SCH MLS/HR: 150 INJECTION, SOLUTION INTRAVENOUS at 10:21

## 2020-12-28 RX ADMIN — FUROSEMIDE SCH MG: 10 INJECTION, SOLUTION INTRAMUSCULAR; INTRAVENOUS at 18:10

## 2020-12-28 RX ADMIN — LABETALOL HYDROCHLORIDE PRN MG: 5 INJECTION, SOLUTION INTRAVENOUS at 02:05

## 2020-12-28 RX ADMIN — SODIUM CHLORIDE SCH MLS/HR: 0.9 INJECTION, SOLUTION INTRAVENOUS at 14:03

## 2020-12-28 RX ADMIN — FUROSEMIDE SCH MG: 10 INJECTION, SOLUTION INTRAMUSCULAR; INTRAVENOUS at 06:48

## 2020-12-28 RX ADMIN — CLINDAMYCIN PHOSPHATE SCH MLS/HR: 150 INJECTION, SOLUTION INTRAVENOUS at 14:03

## 2020-12-28 RX ADMIN — Medication SCH UNIT: at 10:00

## 2020-12-28 RX ADMIN — LABETALOL HYDROCHLORIDE PRN MG: 5 INJECTION, SOLUTION INTRAVENOUS at 18:26

## 2020-12-29 VITALS — SYSTOLIC BLOOD PRESSURE: 115 MMHG | DIASTOLIC BLOOD PRESSURE: 74 MMHG

## 2020-12-29 VITALS — DIASTOLIC BLOOD PRESSURE: 62 MMHG | SYSTOLIC BLOOD PRESSURE: 100 MMHG

## 2020-12-29 VITALS — DIASTOLIC BLOOD PRESSURE: 71 MMHG | SYSTOLIC BLOOD PRESSURE: 115 MMHG

## 2020-12-29 VITALS — SYSTOLIC BLOOD PRESSURE: 102 MMHG | DIASTOLIC BLOOD PRESSURE: 69 MMHG

## 2020-12-29 LAB
ANION GAP SERPL CALCULATED.3IONS-SCNC: 11 MMOL/L (ref 5–15)
BUN SERPL-MCNC: 110 MG/DL (ref 7–18)
BUN/CREAT SERPL: 43.7
CALCIUM SERPL-MCNC: 9.9 MG/DL (ref 8.5–10.1)
CHLORIDE SERPL-SCNC: 114 MMOL/L (ref 98–107)
CO2 SERPL-SCNC: 28 MMOL/L (ref 21–32)
GLUCOSE SERPL-MCNC: 159 MG/DL (ref 74–106)
HCT VFR BLD AUTO: 49.5 % (ref 36–46)
HGB BLD-MCNC: 16.8 G/DL (ref 12.2–16.2)
MCH RBC QN AUTO: 32.5 PG (ref 28–32)
MCV RBC AUTO: 95.6 FL (ref 80–100)
NRBC BLD QL AUTO: 0.3 %
POTASSIUM SERPL-SCNC: 3.5 MMOL/L (ref 3.5–5.1)
SODIUM SERPL-SCNC: 153 MMOL/L (ref 136–145)

## 2020-12-29 RX ADMIN — LABETALOL HYDROCHLORIDE PRN MG: 5 INJECTION, SOLUTION INTRAVENOUS at 04:53

## 2020-12-29 RX ADMIN — Medication SCH MG: at 10:00

## 2020-12-29 RX ADMIN — LABETALOL HYDROCHLORIDE PRN MG: 5 INJECTION, SOLUTION INTRAVENOUS at 00:55

## 2020-12-29 RX ADMIN — SODIUM CHLORIDE SCH MLS/HR: 0.9 INJECTION, SOLUTION INTRAVENOUS at 00:55

## 2020-12-29 RX ADMIN — Medication SCH ML: at 08:00

## 2020-12-29 RX ADMIN — DIGOXIN SCH MCG: 0.25 INJECTION INTRAMUSCULAR; INTRAVENOUS at 10:23

## 2020-12-29 RX ADMIN — DEXTROSE SCH MLS/HR: 5 SOLUTION INTRAVENOUS at 20:00

## 2020-12-29 RX ADMIN — CEFAZOLIN SODIUM SCH MLS/HR: 1 INJECTION, SOLUTION INTRAVENOUS at 18:56

## 2020-12-29 RX ADMIN — DEXTROSE SCH MLS/HR: 5 SOLUTION INTRAVENOUS at 10:34

## 2020-12-29 RX ADMIN — Medication SCH ML: at 12:00

## 2020-12-29 RX ADMIN — LABETALOL HYDROCHLORIDE PRN MG: 5 INJECTION, SOLUTION INTRAVENOUS at 19:06

## 2020-12-29 RX ADMIN — CLINDAMYCIN PHOSPHATE SCH MLS/HR: 150 INJECTION, SOLUTION INTRAVENOUS at 06:06

## 2020-12-29 RX ADMIN — ENOXAPARIN SODIUM SCH MG: 40 INJECTION SUBCUTANEOUS at 00:22

## 2020-12-29 RX ADMIN — CEFAZOLIN SODIUM SCH MLS/HR: 1 INJECTION, SOLUTION INTRAVENOUS at 19:10

## 2020-12-29 RX ADMIN — CLINDAMYCIN PHOSPHATE SCH MLS/HR: 150 INJECTION, SOLUTION INTRAVENOUS at 00:22

## 2020-12-29 RX ADMIN — FUROSEMIDE SCH MG: 10 INJECTION, SOLUTION INTRAMUSCULAR; INTRAVENOUS at 06:06

## 2020-12-29 RX ADMIN — Medication SCH ML: at 18:00

## 2020-12-29 RX ADMIN — METOPROLOL SUCCINATE SCH MG: 50 TABLET, EXTENDED RELEASE ORAL at 10:00

## 2020-12-29 RX ADMIN — ENOXAPARIN SODIUM SCH MG: 40 INJECTION SUBCUTANEOUS at 22:47

## 2020-12-29 RX ADMIN — Medication SCH UNIT: at 10:00

## 2020-12-29 RX ADMIN — ZINC SULFATE CAP 220 MG (50 MG ELEMENTAL ZN) SCH MG: 220 (50 ZN) CAP at 10:00

## 2020-12-30 VITALS — SYSTOLIC BLOOD PRESSURE: 117 MMHG | DIASTOLIC BLOOD PRESSURE: 79 MMHG

## 2020-12-30 VITALS — DIASTOLIC BLOOD PRESSURE: 79 MMHG | SYSTOLIC BLOOD PRESSURE: 117 MMHG

## 2020-12-30 VITALS — SYSTOLIC BLOOD PRESSURE: 117 MMHG | DIASTOLIC BLOOD PRESSURE: 75 MMHG

## 2020-12-30 LAB
ALBUMIN SERPL-MCNC: 1.9 G/DL (ref 3.4–5)
ALP SERPL-CCNC: 92 U/L (ref 45–117)
ALT SERPL-CCNC: 34 U/L (ref 13–56)
ANION GAP SERPL CALCULATED.3IONS-SCNC: 9 MMOL/L (ref 5–15)
BILIRUB SERPL-MCNC: 0.5 MG/DL (ref 0.2–1)
BUN SERPL-MCNC: 120 MG/DL (ref 7–18)
BUN/CREAT SERPL: 53.6
CALCIUM SERPL-MCNC: 9.2 MG/DL (ref 8.5–10.1)
CHLORIDE SERPL-SCNC: 114 MMOL/L (ref 98–107)
CO2 SERPL-SCNC: 30 MMOL/L (ref 21–32)
GLUCOSE SERPL-MCNC: 196 MG/DL (ref 74–106)
HCT VFR BLD AUTO: 51.2 % (ref 36–46)
HGB BLD-MCNC: 17 G/DL (ref 12.2–16.2)
MCH RBC QN AUTO: 32.1 PG (ref 28–32)
MCV RBC AUTO: 96.8 FL (ref 80–100)
POTASSIUM SERPL-SCNC: 3 MMOL/L (ref 3.5–5.1)
PROT SERPL-MCNC: 6.8 G/DL (ref 6.4–8.2)
PROT UR-MCNC: 48.4 MG/DL (ref 0–11.9)
SODIUM SERPL-SCNC: 153 MMOL/L (ref 136–145)

## 2020-12-30 RX ADMIN — METOPROLOL SUCCINATE SCH MG: 50 TABLET, EXTENDED RELEASE ORAL at 10:00

## 2020-12-30 RX ADMIN — Medication SCH UNIT: at 10:00

## 2020-12-30 RX ADMIN — Medication SCH ML: at 08:00

## 2020-12-30 RX ADMIN — CEFAZOLIN SODIUM SCH MLS/HR: 1 INJECTION, SOLUTION INTRAVENOUS at 05:09

## 2020-12-30 RX ADMIN — DEXTROSE SCH MLS/HR: 5 SOLUTION INTRAVENOUS at 05:27

## 2020-12-30 RX ADMIN — ZINC SULFATE CAP 220 MG (50 MG ELEMENTAL ZN) SCH MG: 220 (50 ZN) CAP at 10:00

## 2020-12-30 RX ADMIN — DIGOXIN SCH MCG: 0.25 INJECTION INTRAMUSCULAR; INTRAVENOUS at 09:56

## 2020-12-30 RX ADMIN — Medication SCH MG: at 10:00
